# Patient Record
Sex: FEMALE | Race: BLACK OR AFRICAN AMERICAN | Employment: PART TIME | URBAN - METROPOLITAN AREA
[De-identification: names, ages, dates, MRNs, and addresses within clinical notes are randomized per-mention and may not be internally consistent; named-entity substitution may affect disease eponyms.]

---

## 2020-12-02 ENCOUNTER — HOSPITAL ENCOUNTER (EMERGENCY)
Age: 30
Discharge: HOME | End: 2020-12-02

## 2020-12-02 ENCOUNTER — APPOINTMENT (OUTPATIENT)
Dept: GENERAL RADIOLOGY | Age: 30
End: 2020-12-02

## 2020-12-02 VITALS
TEMPERATURE: 98.5 F | BODY MASS INDEX: 18.42 KG/M2 | SYSTOLIC BLOOD PRESSURE: 122 MMHG | HEIGHT: 72 IN | DIASTOLIC BLOOD PRESSURE: 72 MMHG | HEART RATE: 83 BPM | RESPIRATION RATE: 18 BRPM | WEIGHT: 136.02 LBS | OXYGEN SATURATION: 99 %

## 2020-12-02 PROCEDURE — 99283 EMERGENCY DEPT VISIT LOW MDM: CPT

## 2020-12-02 PROCEDURE — 73502 X-RAY EXAM HIP UNI 2-3 VIEWS: CPT

## 2020-12-02 RX ORDER — ACETAMINOPHEN 500 MG
1000 TABLET ORAL EVERY 6 HOURS PRN
Qty: 40 TABLET | Refills: 0 | Status: SHIPPED | OUTPATIENT
Start: 2020-12-02 | End: 2020-12-07

## 2020-12-02 ASSESSMENT — PAIN DESCRIPTION - PAIN TYPE
TYPE: ACUTE PAIN
TYPE: ACUTE PAIN

## 2020-12-02 ASSESSMENT — PAIN DESCRIPTION - FREQUENCY
FREQUENCY: CONTINUOUS
FREQUENCY: CONTINUOUS

## 2020-12-02 ASSESSMENT — PAIN DESCRIPTION - ORIENTATION
ORIENTATION: LEFT
ORIENTATION: LEFT

## 2020-12-02 ASSESSMENT — ENCOUNTER SYMPTOMS
COUGH: 0
ABDOMINAL PAIN: 0
BACK PAIN: 0
EYE PAIN: 0
SHORTNESS OF BREATH: 0
NAUSEA: 0
VOMITING: 0
SORE THROAT: 0

## 2020-12-02 ASSESSMENT — PAIN DESCRIPTION - ONSET
ONSET: ON-GOING
ONSET: ON-GOING

## 2020-12-02 ASSESSMENT — PAIN DESCRIPTION - DESCRIPTORS
DESCRIPTORS: ACHING
DESCRIPTORS: ACHING

## 2020-12-02 ASSESSMENT — PAIN DESCRIPTION - PROGRESSION
CLINICAL_PROGRESSION: NOT CHANGED
CLINICAL_PROGRESSION: NOT CHANGED

## 2020-12-02 ASSESSMENT — PAIN - FUNCTIONAL ASSESSMENT
PAIN_FUNCTIONAL_ASSESSMENT: PREVENTS OR INTERFERES SOME ACTIVE ACTIVITIES AND ADLS
PAIN_FUNCTIONAL_ASSESSMENT: PREVENTS OR INTERFERES SOME ACTIVE ACTIVITIES AND ADLS

## 2020-12-02 ASSESSMENT — PAIN SCALES - GENERAL
PAINLEVEL_OUTOF10: 7
PAINLEVEL_OUTOF10: 6

## 2020-12-02 ASSESSMENT — PAIN DESCRIPTION - LOCATION
LOCATION: HIP
LOCATION: HIP

## 2020-12-03 NOTE — ED PROVIDER NOTES
[address] [facility]        Pt Name: Shannon Mcrae  MRN: [id number]  [place] 1990  Date of evaluation: 12/2/2020  Provider: VIVI Retana  PCP: No primary care provider on file. YUNG. I have evaluated this patient. My supervising physician was available for consultation. CHIEF COMPLAINT       Chief Complaint   Patient presents with    Hip Pain     left hip pain. pt states her hip occasionally pops. pain now sharp. denies any other injury       HISTORY OF PRESENT ILLNESS   (Location, Timing/Onset, Context/Setting, Quality, Duration, Modifying Factors, Severity, Associated Signs and Symptoms)  Note limiting factors. Shannon Mcrae is a 26 y.o. female presents the emergency department for left hip pain. Patient reports that over the last 10 days she has had intermittent pain of her left hip. Pain will stay local to the hip but sometimes radiate into her lower back. Associated with lifting heavy objects. Denies fever, bowel or bladder incontinence, urinary retention, saddle anesthesia, red or hot joint, numbness, weakness, abdominal pain, chest pain, shortness of breath. Notes pain improved with rest.    Nursing Notes were all reviewed and agreed with or any disagreements were addressed in the HPI. REVIEW OF SYSTEMS    (2-9 systems for level 4, 10 or more for level 5)     Review of Systems   Constitutional: Negative for fever. HENT: Negative for sore throat. Eyes: Negative for pain and visual disturbance. Respiratory: Negative for cough and shortness of breath. Cardiovascular: Negative for chest pain. Gastrointestinal: Negative for abdominal pain, nausea and vomiting. Genitourinary: Negative for dysuria and frequency. Musculoskeletal: Positive for arthralgias. Negative for back pain and neck pain. Skin: Negative for rash.    Neurological: Negative for dizziness, weakness, numbness and headaches. Psychiatric/Behavioral: Negative for confusion. Positives and Pertinent negatives as per HPI. Except as noted above in the ROS, all other systems were reviewed and negative. PAST MEDICAL HISTORY   History reviewed. No pertinent past medical history. SURGICAL HISTORY   History reviewed. No pertinent surgical history. [id number]       Discharge Medication List as of 12/2/2020  4:47 PM            ALLERGIES     Patient has no known allergies. FAMILYHISTORY     History reviewed. No pertinent family history. SOCIAL HISTORY       Social History     Tobacco Use    Smoking status: Never Smoker    Smokeless tobacco: Never Used   Substance Use Topics    Alcohol use: Not Currently    Drug use: Yes     Types: Marijuana       SCREENINGS             PHYSICAL EXAM    (up to 7 for level 4, 8 or more for level 5)     ED Triage Vitals   BP Temp Temp Source Pulse Resp SpO2 Height Weight   12/02/20 1438 12/02/20 1438 12/02/20 1438 12/02/20 1438 12/02/20 1438 12/02/20 1438 12/02/20 1430 12/02/20 1430   122/72 98.5 °F (36.9 °C) Tympanic 83 18 99 % 6' 2\" (1.88 m) 136 lb 0.4 oz (61.7 kg)       Physical Exam  Vitals signs reviewed. Constitutional:       Appearance: She is not diaphoretic. HENT:      Nose: No congestion or rhinorrhea. Eyes:      General: No scleral icterus. Conjunctiva/sclera: Conjunctivae normal.   Neck:      Musculoskeletal: Normal range of motion and neck supple. Cardiovascular:      Rate and Rhythm: Normal rate and regular rhythm. Pulses: Normal pulses. Heart sounds: Normal heart sounds. No murmur. No friction rub. No gallop. Pulmonary:      Effort: Pulmonary effort is normal. No respiratory distress. Breath sounds: Normal breath sounds. No stridor. No wheezing, rhonchi or rales. Abdominal:      General: There is no distension. Palpations: Abdomen is soft. Tenderness: There is no abdominal tenderness.  There is no right CVA tenderness, left CVA tenderness, guarding or rebound. Musculoskeletal: Normal range of motion. General: No tenderness or signs of injury. Comments: No midline paraspinal cervical or thoracic, lumbar tenderness palpation. No red or hot joint range of motion of bilateral hips. 5/5 strength with hip abduction, abduction, abduction, knee flexion attention, ankle plantar dorsiflexion bilaterally. Equal and intact sensation of heel and lateral thigh, medial and lateral calf, first toe, lateral foot, plantar foot bilaterally. 2+ DP, PT pulses bilaterally. Skin:     General: Skin is warm and dry. Neurological:      General: No focal deficit present. Mental Status: She is alert and oriented to person, place, and time. Sensory: No sensory deficit. Motor: No weakness. Gait: Gait normal.   Psychiatric:         Mood and Affect: Mood normal.         Behavior: Behavior normal.         DIAGNOSTIC RESULTS   LABS:    Labs Reviewed - No data to display    All other labs were within normal range or not returned as of this dictation. EKG: All EKG's are interpreted by the Emergency Department Physician in the absence of a cardiologist.  Please see their note for interpretation of EKG. RADIOLOGY:   Non-plain film images such as CT, Ultrasound and MRI are read by the radiologist. Plain radiographic images are visualized and preliminarily interpreted by the ED Provider with the below findings:        Interpretation per the Radiologist below, if available at the time of this note:    XR HIP 2-3 VW W PELVIS LEFT   Final Result   No acute osseous abnormality of the pelvis or left hip. Xr Hip 2-3 Vw W Pelvis Left    Result Date: 12/2/2020  EXAMINATION: ONE XRAY VIEW OF THE PELVIS AND TWO XRAY VIEWS LEFT HIP 12/2/2020 3:54 pm COMPARISON: None.  HISTORY: ORDERING SYSTEM PROVIDED HISTORY: Left hip pain TECHNOLOGIST PROVIDED HISTORY: Reason for exam:->Left hip pain Reason for Exam: Left hip pain feels like popped out post one week Acuity: Acute Type of Exam: Initial FINDINGS: AP pelvis and two views of the left hip demonstrate no acute osseous abnormality. The SI joints and hip joints bilaterally are unremarkable. There is no focal soft tissue abnormality. No acute osseous abnormality of the pelvis or left hip. PROCEDURES   Unless otherwise noted below, none     Procedures    CRITICAL CARE TIME   N/A    CONSULTS:  None      EMERGENCY DEPARTMENT COURSE and DIFFERENTIAL DIAGNOSIS/MDM:   Vitals:    Vitals:    12/02/20 1430 12/02/20 1438   BP:  122/72   Pulse:  83   Resp:  18   Temp:  98.5 °F (36.9 °C)   TempSrc:  Tympanic   SpO2:  99%   Weight: 136 lb 0.4 oz (61.7 kg)    Height: 6' 2\" (1.88 m)        Patient was given the following medications:  Medications - No data to display        33-year old female presents to the emergency department for intermittent left hip pain for proximately 10 days. No red or hot joint, pain with passive range of motion, large joint effusion concerning for septic joint. No midline spinal tenderness, fever, neurologic concern for spinal epidural abscess. X-ray without evidence of fracture or dislocation. Neurovascular intact. Appropriate for discharge with outpatient follow-up. Given prescription for Tylenol, Voltaren gel. Given referral information for local orthopedic physician Dr. Ban Powlel, recommended to call to schedule follow-up ideally to be seen within 2 to 3 weeks. Instructed to return to the emergency department for new or worsening symptoms including but not limited to red or hot joint, fever, back pain, numbness, weakness, bowel or bladder incontinence, urine retention, saddle anesthesia, any other symptoms she is concerned about. Verbal and written discharge instructions and return precautions given. FINAL IMPRESSION      1.  Left hip pain          DISPOSITION/PLAN   DISPOSITION Decision To Discharge 12/02/2020 04:43:31 PM      PATIENT REFERREDTO:  Carmelo Borges MD  [address] [facility]  [place]  [phone]    Call in 1 day        DISCHARGE MEDICATIONS:  Discharge Medication List as of 12/2/2020  4:47 PM      START taking these medications    Details   acetaminophen (TYLENOL) 500 MG tablet Take 2 tablets by mouth every 6 hours as needed for Pain, Disp-40 tablet,R-0Print      diclofenac sodium (VOLTAREN) 1 % GEL Apply 2 g topically 2 times daily, Topical, 2 TIMES DAILY Starting Wed 12/2/2020, Disp-50 g,R-0, Print             DISCONTINUED MEDICATIONS:  Discharge Medication List as of 12/2/2020  4:47 PM                 (Please note that portions of this note were completed with a voice recognition program.  Efforts were made to edit the dictations but occasionally words are mis-transcribed.)    Severa Both, PA (electronically signed)         Severa Both, PA  12/02/20 [id number]